# Patient Record
Sex: MALE | Race: BLACK OR AFRICAN AMERICAN | NOT HISPANIC OR LATINO
[De-identification: names, ages, dates, MRNs, and addresses within clinical notes are randomized per-mention and may not be internally consistent; named-entity substitution may affect disease eponyms.]

---

## 2017-10-15 ENCOUNTER — TRANSCRIPTION ENCOUNTER (OUTPATIENT)
Age: 22
End: 2017-10-15

## 2019-11-07 ENCOUNTER — EMERGENCY (EMERGENCY)
Facility: HOSPITAL | Age: 24
LOS: 1 days | Discharge: HOME | End: 2019-11-07
Attending: STUDENT IN AN ORGANIZED HEALTH CARE EDUCATION/TRAINING PROGRAM
Payer: SELF-PAY

## 2019-11-07 VITALS
OXYGEN SATURATION: 100 % | TEMPERATURE: 98 F | RESPIRATION RATE: 17 BRPM | HEART RATE: 99 BPM | WEIGHT: 188.05 LBS | SYSTOLIC BLOOD PRESSURE: 161 MMHG | DIASTOLIC BLOOD PRESSURE: 83 MMHG | HEIGHT: 72 IN

## 2019-11-07 LAB
HETEROPH AB TITR SER AGGL: NEGATIVE — SIGNIFICANT CHANGE UP
S PYO AG SPEC QL IA: NEGATIVE — SIGNIFICANT CHANGE UP

## 2019-11-07 PROCEDURE — 86308 HETEROPHILE ANTIBODY SCREEN: CPT

## 2019-11-07 PROCEDURE — 99053 MED SERV 10PM-8AM 24 HR FAC: CPT

## 2019-11-07 PROCEDURE — 99283 EMERGENCY DEPT VISIT LOW MDM: CPT

## 2019-11-07 PROCEDURE — 87081 CULTURE SCREEN ONLY: CPT

## 2019-11-07 PROCEDURE — 87880 STREP A ASSAY W/OPTIC: CPT

## 2019-11-07 PROCEDURE — 36415 COLL VENOUS BLD VENIPUNCTURE: CPT

## 2019-11-07 RX ORDER — IBUPROFEN 200 MG
600 TABLET ORAL ONCE
Refills: 0 | Status: COMPLETED | OUTPATIENT
Start: 2019-11-07 | End: 2019-11-07

## 2019-11-07 RX ADMIN — Medication 600 MILLIGRAM(S): at 02:42

## 2019-11-07 NOTE — ED PROVIDER NOTE - PROGRESS NOTE DETAILS
PA NOTE: Neg rapid, advised will take a day or 2 for throat and mono to come back, will wait on abx until then as for now, tylenol and motrin, Pt reassessed, pt feeling better at this time, pain has improved, vss, pt able to walk, talk and vocalized plan of action. Discussed in depth the results and findings that were performed in the ED and explained to pt in depth the next steps that need to be taking including any medications and proper follow up with PCP or specialists. Pt verbalized their concerns and all questions were answered. Pt understands dispo and agrees with discharge.

## 2019-11-07 NOTE — ED PROVIDER NOTE - CLINICAL SUMMARY MEDICAL DECISION MAKING FREE TEXT BOX
23 y/o male with no signficant medical history presents to the ED c/o left sided throat discomfort and difficulty swallowing x 2 days. mono test, rapid and meds and reassess

## 2019-11-07 NOTE — ED PROVIDER NOTE - OBJECTIVE STATEMENT
23 y/o male with no signficant medical history presents to the ED c/o left sided throat discomfort and difficulty swallowing x 2 days. Pt notes discomfort is approx 6/10 in intensity that worsened this morning. Pt is able to tolerate secretions and po intake. Notes did not take any medications for the pain/discomfort. Pain does not radiate. No abdominal pain, cough, congestion, runny nose, fevers. Notes + sick contact with friend who was recently diagnosed with mono and he shared a drink with her.

## 2019-11-07 NOTE — ED PROVIDER NOTE - ATTENDING CONTRIBUTION TO CARE
I personally saw the patient with the PA, and completed the key components of the history and physical exam. I then discussed the management plan with the PA.    pt with pharyngitis, no evidence pta, midline uvula, mild exudate in oropharynx, no difficulty swallowing or manageing secretions, improved with meds, follow up with pmd

## 2019-11-07 NOTE — ED PROVIDER NOTE - PATIENT PORTAL LINK FT
You can access the FollowMyHealth Patient Portal offered by [facility] by registering at the following website: [email or web address] By joining [email or web address] FollowMyHealth portal, you will also be able to view your health information using other applications (apps) compatible with our system.

## 2019-11-07 NOTE — ED PROVIDER NOTE - CHPI ED SYMPTOMS NEG
no syncope/no weakness/no fever/no change in level of consciousness/no chills/no numbness/no vomiting

## 2019-11-07 NOTE — ED ADULT TRIAGE NOTE - CHIEF COMPLAINT QUOTE
"It is uncomfortable when I swallow."  Pt. complaining of "uncomfortable" swallowing since yesterday.  Pt states he is able to swallow, it is just "slightly difficult on my left side."  Pt. denies chest pain/sob/fevers/n/v/d but states he had slight chills yesterday."  Pt. talking without difficulty in triage, able to control secretions.  No edema noted. Pt. denies allergies.

## 2019-11-07 NOTE — ED ADULT NURSE NOTE - OBJECTIVE STATEMENT
Patient is a 24 year old male, A&Ox3 in no apparent distress. c/o difficulty swallowing. Patient states it is uncomfortable and difficult to swallow on the left side of his throat. Symptoms started yesterday but has gotten worse today. Pain 6/10. Airway patent, respirations even, spontaneous and unlabored. Patient speaking in full sentences. Skin warm and dry.

## 2019-11-09 LAB
CULTURE RESULTS: SIGNIFICANT CHANGE UP
SPECIMEN SOURCE: SIGNIFICANT CHANGE UP

## 2021-06-25 ENCOUNTER — TRANSCRIPTION ENCOUNTER (OUTPATIENT)
Age: 26
End: 2021-06-25

## 2021-07-19 ENCOUNTER — TRANSCRIPTION ENCOUNTER (OUTPATIENT)
Age: 26
End: 2021-07-19

## 2024-03-04 PROBLEM — Z78.9 OTHER SPECIFIED HEALTH STATUS: Chronic | Status: ACTIVE | Noted: 2019-11-07

## 2024-07-09 PROBLEM — Z00.00 ENCOUNTER FOR PREVENTIVE HEALTH EXAMINATION: Status: ACTIVE | Noted: 2024-07-09

## 2024-07-11 ENCOUNTER — APPOINTMENT (OUTPATIENT)
Dept: FAMILY MEDICINE | Facility: CLINIC | Age: 29
End: 2024-07-11